# Patient Record
(demographics unavailable — no encounter records)

---

## 2024-12-30 NOTE — PHYSICAL EXAM
[Chaperone Present] : A chaperone was present in the examining room during all aspects of the physical examination [39292] : A chaperone was present during the pelvic exam. [No Acute Distress] : in no acute distress [Cough] : cough [No Edema] : ~T edema was not present [Scar] : a scar was noted [Warm and Dry] : was warm and dry to touch [Normal Gait] : gait was normal [Normal Appearance] : general appearance was normal [3] : 3 [Aa ____] : Aa [unfilled] [Ba ____] : Ba [unfilled] [C ____] : C [unfilled] [GH ____] : GH [unfilled] [PB ____] : PB [unfilled] [TVL ____] : TVL  [unfilled] [Ap ____] : Ap [unfilled] [Bp ____] : Bp [unfilled] [D ____] : D [unfilled] [Absent] : absent [Normal] : no abnormalities [Post Void Residual ____ml] : post void residual was [unfilled] ml [FreeTextEntry2] : Della [Tenderness] : ~T no ~M abdominal tenderness observed [Distended] : not distended [Hernia] : no hernia observed [FreeTextEntry4] : no exposure

## 2024-12-30 NOTE — HISTORY OF PRESENT ILLNESS
[FreeTextEntry1] : MILA is a 74 year female who presents for questionable recurrent prolapse. Recalls having surgery for prolapse in 2009 and had a surgical procedure that was done vaginally and she had a mesh. She had a colonoscopy in 2022 and started to feel pain in her pelvic area. Has had rectal bleeding from hemorrhoid surgery. Had hysterectomy but the details are unclear.  She brought records from her hospital visit in September. I reviewed her blood work and the CT scan report that said possible diverticulitis.  Daytime frequency:  yes Nocturia:  4-5 Urinary urgency: yes Leakage of urine with urgency:  yes Leakage of urine with coughing sneezing laughing:  yes Incontinence pad use: yes Sensation of incomplete bladder emptying: yes History of frequent urinary tract infections: denies History of hematuria: denies Previous treatment:  prolapse Vaginal symptoms:  discomfort Bowel symptoms: constipation

## 2024-12-30 NOTE — REASON FOR VISIT
[Initial Visit ___] : an initial visit for [unfilled] [Pelvic Organ Prolapse] : pelvic organ prolapse [Problems With Defecation] : problems with defecation

## 2024-12-30 NOTE — PHYSICAL EXAM
[Chaperone Present] : A chaperone was present in the examining room during all aspects of the physical examination [[id number]] : A chaperone was present during the pelvic exam. [No Acute Distress] : in no acute distress [Cough] : cough [No Edema] : ~T edema was not present [Scar] : a scar was noted [Warm and Dry] : was warm and dry to touch [Normal Gait] : gait was normal [Normal Appearance] : general appearance was normal [3] : 3 [Aa ____] : Aa [unfilled] [Ba ____] : Ba [unfilled] [C ____] : C [unfilled] [GH ____] : GH [unfilled] [PB ____] : PB [unfilled] [TVL ____] : TVL  [unfilled] [Ap ____] : Ap [unfilled] [Bp ____] : Bp [unfilled] [D ____] : D [unfilled] [Absent] : absent [Normal] : no abnormalities [Post Void Residual ____ml] : post void residual was [unfilled] ml [FreeTextEntry2] : Della [Tenderness] : ~T no ~M abdominal tenderness observed [Distended] : not distended [Hernia] : no hernia observed [FreeTextEntry4] : no exposure

## 2024-12-30 NOTE — ADDENDUM
[FreeTextEntry1] : This note was written by Francy Groves, acting as the  for Dr. Mares. This note accurately reflects the work and decisions made by Dr. Mares.

## 2024-12-30 NOTE — DISCUSSION/SUMMARY
[FreeTextEntry1] : MILA is a 74 year female who presents for prolapse, pelvic pain, prior prolapse repair. On exam, normal PVR, moderate rectocele.  I asked Mila to sign a release of records to obtain any information regarding her operative procedure done for prolapse in the past.  It does feel to me like either she had a sling or possibly an anterior mesh.  We talked about her rectocele and how that could be contributing to some of her difficult zandra dysfunction.  We discussed physical therapy observation and posterior repair.  She is interested in surgical correction.  We talked about postoperative follow-up and recovery including the restrictions postoperatively.  I will defer any bladder testing at this time.  I was able to answer all of her questions.   [] U/S C+S [] Schedule posterior repair Records request from Dr. Barbosa    All questions answered.

## 2024-12-30 NOTE — LETTER BODY
[Dear  ___] : Dear  [unfilled], [I had the pleasure of evaluating your patient, [unfilled]. Thank you for referring Ms. [unfilled] for consultation for ___] : I had the pleasure of evaluating your patient, [unfilled]. Thank you for referring Ms. [unfilled] for consultation for [unfilled]. [Attached please find my note.] : Attached please find my note. [Thank you very much for allowing me to participate in the care of this patient. If you have any questions, please do not hesitate to contact me] : Thank you very much for allowing me to participate in the care of this patient. If you have any questions, please do not hesitate to contact me. [DrYang  ___] : Dr. PEDERSON

## 2024-12-30 NOTE — OB HISTORY
[AB Spont ___] : [unfilled] miscarriage(s) [Approximately ___ (Month)] : the LMP was approximately [unfilled] month(s) ago [Last Pap Smear ___] : date of last pap smear was on [unfilled] [Abnormal Pap Smear] : normal pap smear [Taking Estrogens] : is not taking estrogen replacement [Sexually Active] : is not sexually active

## 2024-12-30 NOTE — PHYSICAL EXAM
[Chaperone Present] : A chaperone was present in the examining room during all aspects of the physical examination [[id number]] : A chaperone was present during the pelvic exam. [No Acute Distress] : in no acute distress [Cough] : cough [No Edema] : ~T edema was not present [Scar] : a scar was noted [Warm and Dry] : was warm and dry to touch [Normal Gait] : gait was normal [Normal Appearance] : general appearance was normal [3] : 3 [Aa ____] : Aa [unfilled] [Ba ____] : Ba [unfilled] [C ____] : C [unfilled] [GH ____] : GH [unfilled] [PB ____] : PB [unfilled] [TVL ____] : TVL  [unfilled] [Ap ____] : Ap [unfilled] [Bp ____] : Bp [unfilled] [D ____] : D [unfilled] [Absent] : absent [Normal] : no abnormalities [Post Void Residual ____ml] : post void residual was [unfilled] ml [FreeTextEntry2] : Della [Tenderness] : ~T no ~M abdominal tenderness observed [Distended] : not distended [Hernia] : no hernia observed [FreeTextEntry4] : no exposure

## 2025-03-06 NOTE — REASON FOR VISIT
[Follow-Up Visit_____] : a follow-up visit for [unfilled] [Pelvic Organ Prolapse] : pelvic organ prolapse [Problems With Defecation] : problems with defecation

## 2025-03-06 NOTE — HISTORY OF PRESENT ILLNESS
[FreeTextEntry1] : MILA is a 74 year female who presents for f/u on POP and pelvic pain. Here to discuss POSTERIOR REPAIR CYSTO scheduled for 3/24/25. Last seen 12/30/24 h/o prolapse surgery in 2014 with posterior repair and monarc sling, cysto. Spoke about her rectocele and how that could be contributing to some of her defacatory dysfunction.      Defer UDS testing.

## 2025-03-06 NOTE — DISCUSSION/SUMMARY
[FreeTextEntry1] : Ms. BEASLEY presented to the office today for counseling regarding her decision for possible pelvic reconstructive surgery. All pertinent prior studies including urodynamic were reviewed. 						 The patient was counseled regarding alternative non-surgical therapies as well as the prognosis with no intervention.  The patient was advised regarding various surgical options including abdominal, robotic/laparascopic and vaginal approaches. The risks and benefits of surgery using endogenous tissue only versus the use of graft insertion with biologic graft versus permanent mesh were fully reviewed.  She was informed of the inherent risk of graft use including but not limited to infection, chronic inflammation, acute and chronic pain, pain with intercourse (both of which may be refractory to treatment) fistula, disturbance in bowel or bladder function, any of which may require additional surgery for revision.  She is aware that there will be no graft used in her surgery.  The patient was advised regarding the July 2011 FDA notification regarding these issues and provided the website address for further reference www.fda.gov <http://www.fda.gov/>.    The general risks of the surgery were reviewed including, but not limited to infection, bleeding, including transfusion, surrounding organ or tissue injury (bladder, rectum, bowel, urethra, ureters, nerves vessels or muscles), failure meaning recurrent prolapse, leaking, voiding dysfunction, needing to go home with a catheter, pain with sex, blood clots, and anesthesia.  The approximate length of the surgery, hospital stay and postoperative recovery period were reviewed, including a general overview of convalescence and postoperative followup.  The patient is aware that learner's (medical students/residents/fellows) may be participating in a pelvic exam under anesthesia.  The patient verbalized a desire to proceed with the surgery.  Appropriate informed consent was obtained for posterior repair, cysto.  All questions were answered to the patient's satisfaction. We are looking to schedule her.

## 2025-03-06 NOTE — ADDENDUM
[FreeTextEntry1] : This note was written by Francy Groves, acting as the  for Dr. Maers. This note accurately reflects the work and decisions made by Dr. Mares.

## 2025-04-10 NOTE — SUBJECTIVE
[FreeTextEntry1] : Pt doing ok but states "my bottom is raw, and it burns when I pee" [FreeTextEntry8] : None new [FreeTextEntry7] : Pt reports vaginal soreness and lower abdominal pain mildly alleviated with Advil [FreeTextEntry6] : Normal. Denies n/v [FreeTextEntry5] : Pt c/o burning sensation with urination and "my bottom is raw". Pt states she is "sweating between legs, I may have a fever". Denies chills, hematuria or flank pain. Denies sensation of incomplete bladder emptying.  [FreeTextEntry4] : Last BM this am [FreeTextEntry3] : Normal [FreeTextEntry2] : Unchanged

## 2025-04-10 NOTE — OBJECTIVE
[Soft and Nontender] : soft and nontender [Clean, Dry, Intact] : Clean, Dry, Intact [Post Void Residual ____ ml] : Post Void Residual was [unfilled] ml [Good Support] : Good support [Healing well] : healing well [No Masses or Tenderness] : no masses or tenderness [Voiding Trial] : No voiding trial was performed [FreeTextEntry3] : pt unable to tolerate speculum exam; digital exam performed sutures intact

## 2025-04-10 NOTE — DISCUSSION/SUMMARY
[Post-Op instructions given. Pt/family verbalizes understanding] : post-operative instructions were provided to the patient/family who verbalize understanding [Risks/Benefits discussed. Pt/family verbalizes understanding] : risks and benefits of the procedure were discussed with the patient/family who verbalize understanding [FreeTextEntry1] : Post-op activity restrictions discussed including pelvic rest, no heavy lifting, pushing or pulling, nothing in vagina for next 4 weeks, no bathtub, no sex. Discussed urine seen in office and UA dipstick read moderate blood which can indicate a UTI with her symptoms. Will be sending urine for cath UA and urine culture for further evaluation; will follow up with results and change medication if resistant to Bactrim. Discussed empiric treatment vs waiting for results and pt wants to be treated empirically. She denies any known drug allergies to Bactrim. Bactrim eRx sent to pt's preferred pharmacy. Medication instructions given. Pt verbalized understanding.  On VE moderate irritation noted to right buttock. Desitin applied. Pt advised to use Desitin, or any zinc oxide based cream as a barrier to help with irritation. Pt verbalized understanding. Pt to RTO for scheduled 2-week POA on 4/17/25 with Davon. All questions answered. Instructed to call with any questions or concerns.

## 2025-04-17 NOTE — DISCUSSION/SUMMARY
[FreeTextEntry1] : Pelvic exam performed: VE: no swelling or bleeding noted, stiches present at the posterior forchet Perineum: external hemorhoidal skin tags noted, 3cm  monilial rash with skin irritation to the right of the rectum  # rash -Mycolog cream sent to pharmacy  # post op -Postoperative instructions reviewed including daily bowel regimen to reduce constipation, no heavy lifting  and nothing in the vagina until 6 weeks post op - Urine sent for c/o profuse sweating and back pain  []Udip negative []f/u UA and Ucx

## 2025-04-17 NOTE — ASSESSMENT
[FreeTextEntry1] : 75yo Post op now 2 weeks s/p Posterior repair, cysto Today patient states she continues to have swelling  on the rectum that has spread.  States she used Zinc Oxide diaper cream but it hasn't worked Took Bactrim last dose on Monday that decreased sweating since then she has started having profuse sweating again feels that her skin near the rectum is raw Reports minimal bleeding on panty liner, and back pain

## 2025-05-08 NOTE — HISTORY OF PRESENT ILLNESS
[FreeTextEntry1] : MILA is a 74 year female who presents for f/u s/p posterior repair cysto on 04/01/2025. Last seen in office on 04/17/2025 with c/o swelling on rectum and profuse sweating. On exam, external hemorrhoidal skin tags and 3cm monilial rash with skin irritation to the right of the rectum noted and prescribed Mycolog cream. She has some pain vaginally and some intermittent spotting. She is also sweating a great deal but that has been an issues as per patient since her first rectocele repair, although she is reporting it is increased now. Taking softners to help with BMs.   H/o prolapse surgery in 2014 with posterior repair and monarc sling, cysto.  UA w/ micro 04/17/2025 - Trace blood, Trace leukocyte esterase  Negative Urine Cx 04/17/2025

## 2025-05-08 NOTE — PHYSICAL EXAM
[Chaperoned Physical Exam] : A chaperone was present in the examining room during all aspects of the physical examination. [MA] : MA [Normal] : was normal [Normal Appearance] : general appearance was normal [Atrophy] : atrophy [Aa ____] : Aa [unfilled] [Ba ____] : Ba [unfilled] [C ____] : C [unfilled] [GH ____] : GH [unfilled] [PB ____] : PB [unfilled] [TVL ____] : TVL  [unfilled] [Ap ____] : Ap [unfilled] [Bp ____] : Bp [unfilled] [D ____] : D [unfilled] [FreeTextEntry2] : Estela [FreeTextEntry4] : sutures still in place

## 2025-05-08 NOTE — DISCUSSION/SUMMARY
[FreeTextEntry1] : MILA is a 74 year female who presents for f/u s/p posterior repair cysto on 04/01/2025. Healing well. I reassured patient that she is healing well. The sutures have not fully dissolved and this could account for her discharge, bleeding and occasional sharp vaginal pain.   Healing well. Cleared to gradually return to regular activities.   [] Follow up in 4-5 months. All questions answered.

## 2025-05-08 NOTE — ADDENDUM
[FreeTextEntry1] : This note was written by Lindsey Huang, acting as the  for Dr. Mares. This note accurately reflects the work and decisions made by Dr. Mares.